# Patient Record
Sex: FEMALE | Race: ASIAN
[De-identification: names, ages, dates, MRNs, and addresses within clinical notes are randomized per-mention and may not be internally consistent; named-entity substitution may affect disease eponyms.]

---

## 2018-08-19 ENCOUNTER — HOSPITAL ENCOUNTER (EMERGENCY)
Dept: HOSPITAL 15 - ER | Age: 58
Discharge: HOME | End: 2018-08-19
Payer: OTHER GOVERNMENT

## 2018-08-19 VITALS — BODY MASS INDEX: 23.02 KG/M2 | WEIGHT: 125.12 LBS | HEIGHT: 62 IN

## 2018-08-19 VITALS — SYSTOLIC BLOOD PRESSURE: 118 MMHG | DIASTOLIC BLOOD PRESSURE: 74 MMHG

## 2018-08-19 DIAGNOSIS — I10: ICD-10-CM

## 2018-08-19 DIAGNOSIS — Z88.8: ICD-10-CM

## 2018-08-19 DIAGNOSIS — E11.9: ICD-10-CM

## 2018-08-19 DIAGNOSIS — E78.5: ICD-10-CM

## 2018-08-19 DIAGNOSIS — Z98.51: ICD-10-CM

## 2018-08-19 DIAGNOSIS — M79.605: Primary | ICD-10-CM

## 2018-08-19 PROCEDURE — 93971 EXTREMITY STUDY: CPT

## 2020-04-06 ENCOUNTER — HOSPITAL ENCOUNTER (OUTPATIENT)
Dept: HOSPITAL 15 - LAB | Age: 60
Discharge: HOME | End: 2020-04-06
Attending: NURSE PRACTITIONER
Payer: COMMERCIAL

## 2020-04-06 DIAGNOSIS — Z03.818: Primary | ICD-10-CM

## 2020-08-19 ENCOUNTER — HOSPITAL ENCOUNTER (EMERGENCY)
Dept: HOSPITAL 15 - ER | Age: 60
Discharge: HOME | End: 2020-08-19
Payer: COMMERCIAL

## 2020-08-19 VITALS — SYSTOLIC BLOOD PRESSURE: 138 MMHG | DIASTOLIC BLOOD PRESSURE: 73 MMHG

## 2020-08-19 VITALS — BODY MASS INDEX: 23.55 KG/M2 | HEIGHT: 62 IN | WEIGHT: 128 LBS
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
## 2020-08-19 DIAGNOSIS — S00.83XA: Primary | ICD-10-CM

## 2020-08-19 DIAGNOSIS — J32.9: ICD-10-CM

## 2020-08-19 PROCEDURE — 70486 CT MAXILLOFACIAL W/O DYE: CPT

## 2020-08-19 PROCEDURE — 72125 CT NECK SPINE W/O DYE: CPT

## 2020-08-19 PROCEDURE — 70450 CT HEAD/BRAIN W/O DYE: CPT

## 2020-11-08 ENCOUNTER — HOSPITAL ENCOUNTER (EMERGENCY)
Dept: HOSPITAL 15 - ER | Age: 60
Discharge: HOME | End: 2020-11-08
Payer: OTHER GOVERNMENT

## 2020-11-08 VITALS — WEIGHT: 129 LBS | BODY MASS INDEX: 23.74 KG/M2 | HEIGHT: 62 IN

## 2020-11-08 VITALS — DIASTOLIC BLOOD PRESSURE: 71 MMHG | SYSTOLIC BLOOD PRESSURE: 151 MMHG

## 2020-11-08 DIAGNOSIS — Z20.828: ICD-10-CM

## 2020-11-08 DIAGNOSIS — H92.01: ICD-10-CM

## 2020-11-08 DIAGNOSIS — J02.9: Primary | ICD-10-CM

## 2020-11-08 DIAGNOSIS — E78.5: ICD-10-CM

## 2020-11-08 DIAGNOSIS — Z98.51: ICD-10-CM

## 2020-11-08 DIAGNOSIS — I10: ICD-10-CM

## 2020-11-08 DIAGNOSIS — E11.9: ICD-10-CM

## 2020-11-08 PROCEDURE — 87426 SARSCOV CORONAVIRUS AG IA: CPT

## 2020-11-08 PROCEDURE — 36415 COLL VENOUS BLD VENIPUNCTURE: CPT

## 2020-11-08 PROCEDURE — 87880 STREP A ASSAY W/OPTIC: CPT

## 2020-11-08 PROCEDURE — 71045 X-RAY EXAM CHEST 1 VIEW: CPT

## 2020-11-08 PROCEDURE — 87070 CULTURE OTHR SPECIMN AEROBIC: CPT

## 2020-11-08 PROCEDURE — 87804 INFLUENZA ASSAY W/OPTIC: CPT

## 2020-12-16 ENCOUNTER — HOSPITAL ENCOUNTER (OUTPATIENT)
Dept: HOSPITAL 15 - LAB | Age: 60
Discharge: HOME | End: 2020-12-16
Attending: NURSE PRACTITIONER
Payer: COMMERCIAL

## 2020-12-16 DIAGNOSIS — U07.1: Primary | ICD-10-CM

## 2020-12-18 ENCOUNTER — HOSPITAL ENCOUNTER (EMERGENCY)
Dept: HOSPITAL 15 - ER | Age: 60
Discharge: HOME | End: 2020-12-18
Payer: COMMERCIAL

## 2020-12-18 VITALS — WEIGHT: 128 LBS | BODY MASS INDEX: 23.55 KG/M2 | HEIGHT: 62 IN

## 2020-12-18 VITALS — SYSTOLIC BLOOD PRESSURE: 188 MMHG | DIASTOLIC BLOOD PRESSURE: 83 MMHG

## 2020-12-18 DIAGNOSIS — J20.8: ICD-10-CM

## 2020-12-18 DIAGNOSIS — U07.1: Primary | ICD-10-CM

## 2020-12-18 DIAGNOSIS — E11.9: ICD-10-CM

## 2020-12-18 DIAGNOSIS — E78.5: ICD-10-CM

## 2020-12-18 DIAGNOSIS — Z98.51: ICD-10-CM

## 2020-12-18 DIAGNOSIS — I10: ICD-10-CM

## 2020-12-18 PROCEDURE — 71045 X-RAY EXAM CHEST 1 VIEW: CPT

## 2020-12-18 PROCEDURE — 99283 EMERGENCY DEPT VISIT LOW MDM: CPT

## 2020-12-18 PROCEDURE — 96372 THER/PROPH/DIAG INJ SC/IM: CPT

## 2020-12-21 ENCOUNTER — HOSPITAL ENCOUNTER (OUTPATIENT)
Dept: HOSPITAL 15 - ER | Age: 60
Discharge: HOME | End: 2020-12-21
Attending: INTERNAL MEDICINE
Payer: OTHER GOVERNMENT

## 2020-12-21 VITALS — SYSTOLIC BLOOD PRESSURE: 137 MMHG | DIASTOLIC BLOOD PRESSURE: 71 MMHG

## 2020-12-21 VITALS — DIASTOLIC BLOOD PRESSURE: 70 MMHG | SYSTOLIC BLOOD PRESSURE: 120 MMHG

## 2020-12-21 VITALS — DIASTOLIC BLOOD PRESSURE: 73 MMHG | SYSTOLIC BLOOD PRESSURE: 133 MMHG

## 2020-12-21 VITALS — DIASTOLIC BLOOD PRESSURE: 77 MMHG | SYSTOLIC BLOOD PRESSURE: 131 MMHG

## 2020-12-21 VITALS — DIASTOLIC BLOOD PRESSURE: 97 MMHG | SYSTOLIC BLOOD PRESSURE: 145 MMHG

## 2020-12-21 VITALS — DIASTOLIC BLOOD PRESSURE: 71 MMHG | SYSTOLIC BLOOD PRESSURE: 127 MMHG

## 2020-12-21 VITALS — BODY MASS INDEX: 29.85 KG/M2 | HEIGHT: 55 IN | WEIGHT: 129 LBS

## 2020-12-21 VITALS — DIASTOLIC BLOOD PRESSURE: 67 MMHG | SYSTOLIC BLOOD PRESSURE: 128 MMHG

## 2020-12-21 DIAGNOSIS — Z23: Primary | ICD-10-CM

## 2020-12-21 DIAGNOSIS — U07.1: ICD-10-CM

## 2021-02-16 ENCOUNTER — HOSPITAL ENCOUNTER (EMERGENCY)
Dept: HOSPITAL 15 - ER | Age: 61
Discharge: HOME | End: 2021-02-16
Payer: OTHER GOVERNMENT

## 2021-02-16 VITALS — SYSTOLIC BLOOD PRESSURE: 157 MMHG | DIASTOLIC BLOOD PRESSURE: 89 MMHG

## 2021-02-16 VITALS — WEIGHT: 125 LBS | HEIGHT: 62 IN | BODY MASS INDEX: 23 KG/M2

## 2021-02-16 DIAGNOSIS — Z20.822: ICD-10-CM

## 2021-02-16 DIAGNOSIS — E86.0: Primary | ICD-10-CM

## 2021-02-16 PROCEDURE — 36415 COLL VENOUS BLD VENIPUNCTURE: CPT

## 2021-02-16 PROCEDURE — 87426 SARSCOV CORONAVIRUS AG IA: CPT

## 2021-02-16 PROCEDURE — 99283 EMERGENCY DEPT VISIT LOW MDM: CPT

## 2023-01-11 ENCOUNTER — HOSPITAL ENCOUNTER (EMERGENCY)
Dept: HOSPITAL 15 - ER | Age: 63
Discharge: HOME | End: 2023-01-11
Payer: OTHER GOVERNMENT

## 2023-01-11 VITALS — WEIGHT: 132.28 LBS | BODY MASS INDEX: 24.34 KG/M2 | HEIGHT: 62 IN

## 2023-01-11 VITALS — DIASTOLIC BLOOD PRESSURE: 83 MMHG | SYSTOLIC BLOOD PRESSURE: 146 MMHG

## 2023-01-11 DIAGNOSIS — E78.5: ICD-10-CM

## 2023-01-11 DIAGNOSIS — I10: ICD-10-CM

## 2023-01-11 DIAGNOSIS — E11.9: ICD-10-CM

## 2023-01-11 DIAGNOSIS — U07.1: Primary | ICD-10-CM

## 2023-01-11 DIAGNOSIS — Z88.8: ICD-10-CM

## 2023-01-11 DIAGNOSIS — Z98.51: ICD-10-CM

## 2023-01-11 PROCEDURE — 87426 SARSCOV CORONAVIRUS AG IA: CPT

## 2023-01-11 PROCEDURE — 36415 COLL VENOUS BLD VENIPUNCTURE: CPT

## 2023-04-08 ENCOUNTER — HOSPITAL ENCOUNTER (EMERGENCY)
Dept: HOSPITAL 15 - ER | Age: 63
Discharge: ANOTHER HOSPITAL | End: 2023-04-08
Payer: OTHER GOVERNMENT

## 2023-04-08 VITALS — BODY MASS INDEX: 24.34 KG/M2 | HEIGHT: 62 IN | WEIGHT: 132.28 LBS

## 2023-04-08 VITALS — SYSTOLIC BLOOD PRESSURE: 122 MMHG | DIASTOLIC BLOOD PRESSURE: 71 MMHG

## 2023-04-08 DIAGNOSIS — N13.2: ICD-10-CM

## 2023-04-08 DIAGNOSIS — E78.5: ICD-10-CM

## 2023-04-08 DIAGNOSIS — I72.2: Primary | ICD-10-CM

## 2023-04-08 DIAGNOSIS — I10: ICD-10-CM

## 2023-04-08 DIAGNOSIS — Z79.2: ICD-10-CM

## 2023-04-08 DIAGNOSIS — Z79.899: ICD-10-CM

## 2023-04-08 DIAGNOSIS — Z20.822: ICD-10-CM

## 2023-04-08 DIAGNOSIS — E11.9: ICD-10-CM

## 2023-04-08 DIAGNOSIS — Z88.8: ICD-10-CM

## 2023-04-08 LAB
ALBUMIN SERPL-MCNC: 3.5 G/DL (ref 3.4–5)
ALP SERPL-CCNC: 54 U/L (ref 45–117)
ALT SERPL-CCNC: 31 U/L (ref 13–56)
ANION GAP SERPL CALCULATED.3IONS-SCNC: 7 MMOL/L (ref 5–15)
BILIRUB SERPL-MCNC: 0.4 MG/DL (ref 0.2–1)
BUN SERPL-MCNC: 21 MG/DL (ref 7–18)
BUN/CREAT SERPL: 23.9 (ref 10–20)
CALCIUM SERPL-MCNC: 8.9 MG/DL (ref 8.5–10.1)
CHLORIDE SERPL-SCNC: 106 MMOL/L (ref 98–107)
CO2 SERPL-SCNC: 24 MMOL/L (ref 21–32)
GLUCOSE SERPL-MCNC: 148 MG/DL (ref 74–106)
HCT VFR BLD AUTO: 39.1 % (ref 36–46)
HGB BLD-MCNC: 13 G/DL (ref 12.2–16.2)
INR PPP: 0.92 (ref 0.9–1.15)
LIPASE SERPL-CCNC: 108 U/L (ref 73–393)
MCH RBC QN AUTO: 31.9 PG (ref 28–32)
MCV RBC AUTO: 96.1 FL (ref 80–100)
NRBC BLD QL AUTO: 0 %
POTASSIUM SERPL-SCNC: 3.4 MMOL/L (ref 3.5–5.1)
PROT SERPL-MCNC: 7.2 G/DL (ref 6.4–8.2)
SODIUM SERPL-SCNC: 137 MMOL/L (ref 136–145)

## 2023-04-08 PROCEDURE — 96374 THER/PROPH/DIAG INJ IV PUSH: CPT

## 2023-04-08 PROCEDURE — 99285 EMERGENCY DEPT VISIT HI MDM: CPT

## 2023-04-08 PROCEDURE — 83690 ASSAY OF LIPASE: CPT

## 2023-04-08 PROCEDURE — 85025 COMPLETE CBC W/AUTO DIFF WBC: CPT

## 2023-04-08 PROCEDURE — 80053 COMPREHEN METABOLIC PANEL: CPT

## 2023-04-08 PROCEDURE — 81001 URINALYSIS AUTO W/SCOPE: CPT

## 2023-04-08 PROCEDURE — 85610 PROTHROMBIN TIME: CPT

## 2023-04-08 PROCEDURE — 96361 HYDRATE IV INFUSION ADD-ON: CPT

## 2023-04-08 PROCEDURE — 96375 TX/PRO/DX INJ NEW DRUG ADDON: CPT

## 2023-04-08 PROCEDURE — 74175 CTA ABDOMEN W/CONTRAST: CPT

## 2023-04-08 PROCEDURE — 74176 CT ABD & PELVIS W/O CONTRAST: CPT

## 2023-04-08 PROCEDURE — 36415 COLL VENOUS BLD VENIPUNCTURE: CPT

## 2023-04-08 PROCEDURE — 87426 SARSCOV CORONAVIRUS AG IA: CPT

## 2023-04-15 ENCOUNTER — HOSPITAL ENCOUNTER (EMERGENCY)
Dept: HOSPITAL 15 - ER | Age: 63
Discharge: HOME | End: 2023-04-15
Payer: MEDICARE

## 2023-04-15 VITALS — BODY MASS INDEX: 25 KG/M2 | HEIGHT: 63 IN | WEIGHT: 141.1 LBS

## 2023-04-15 VITALS — SYSTOLIC BLOOD PRESSURE: 106 MMHG | DIASTOLIC BLOOD PRESSURE: 86 MMHG

## 2023-04-15 DIAGNOSIS — I10: ICD-10-CM

## 2023-04-15 DIAGNOSIS — E78.5: ICD-10-CM

## 2023-04-15 DIAGNOSIS — Z98.890: ICD-10-CM

## 2023-04-15 DIAGNOSIS — E11.9: ICD-10-CM

## 2023-04-15 DIAGNOSIS — R50.9: Primary | ICD-10-CM

## 2023-04-15 DIAGNOSIS — Z98.51: ICD-10-CM

## 2023-04-15 LAB
ALBUMIN SERPL-MCNC: 3.8 G/DL (ref 3.4–5)
ALP SERPL-CCNC: 54 U/L (ref 45–117)
ALT SERPL-CCNC: 23 U/L (ref 13–56)
ANION GAP SERPL CALCULATED.3IONS-SCNC: 7 MMOL/L (ref 5–15)
BILIRUB SERPL-MCNC: 0.9 MG/DL (ref 0.2–1)
BUN SERPL-MCNC: 12 MG/DL (ref 7–18)
BUN/CREAT SERPL: 19.7 (ref 10–20)
CALCIUM SERPL-MCNC: 9 MG/DL (ref 8.5–10.1)
CHLORIDE SERPL-SCNC: 107 MMOL/L (ref 98–107)
CO2 SERPL-SCNC: 23 MMOL/L (ref 21–32)
GLUCOSE SERPL-MCNC: 93 MG/DL (ref 74–106)
HCT VFR BLD AUTO: 34.7 % (ref 36–46)
HGB BLD-MCNC: 11.7 G/DL (ref 12.2–16.2)
MCH RBC QN AUTO: 31.5 PG (ref 28–32)
MCV RBC AUTO: 93.4 FL (ref 80–100)
NRBC BLD QL AUTO: 0.1 %
POTASSIUM SERPL-SCNC: 3.4 MMOL/L (ref 3.5–5.1)
PROT SERPL-MCNC: 7.3 G/DL (ref 6.4–8.2)
SODIUM SERPL-SCNC: 137 MMOL/L (ref 136–145)

## 2023-04-15 PROCEDURE — 85025 COMPLETE CBC W/AUTO DIFF WBC: CPT

## 2023-04-15 PROCEDURE — 81001 URINALYSIS AUTO W/SCOPE: CPT

## 2023-04-15 PROCEDURE — 83605 ASSAY OF LACTIC ACID: CPT

## 2023-04-15 PROCEDURE — 74176 CT ABD & PELVIS W/O CONTRAST: CPT

## 2023-04-15 PROCEDURE — 71046 X-RAY EXAM CHEST 2 VIEWS: CPT

## 2023-04-15 PROCEDURE — 80053 COMPREHEN METABOLIC PANEL: CPT

## 2023-04-15 PROCEDURE — 36415 COLL VENOUS BLD VENIPUNCTURE: CPT

## 2024-07-21 ENCOUNTER — HOSPITAL ENCOUNTER (EMERGENCY)
Dept: HOSPITAL 15 - ER | Age: 64
Discharge: HOME | End: 2024-07-21
Payer: MEDICARE

## 2024-07-21 VITALS
TEMPERATURE: 98.5 F | SYSTOLIC BLOOD PRESSURE: 116 MMHG | HEART RATE: 70 BPM | RESPIRATION RATE: 16 BRPM | OXYGEN SATURATION: 100 % | DIASTOLIC BLOOD PRESSURE: 56 MMHG

## 2024-07-21 VITALS — WEIGHT: 134.48 LBS | HEIGHT: 62 IN | BODY MASS INDEX: 24.75 KG/M2

## 2024-07-21 DIAGNOSIS — R05.9: Primary | ICD-10-CM

## 2024-07-22 ENCOUNTER — HOSPITAL ENCOUNTER (OUTPATIENT)
Dept: HOSPITAL 15 - LAB | Age: 64
Discharge: HOME | End: 2024-07-22
Attending: INTERNAL MEDICINE
Payer: MEDICARE

## 2024-07-22 DIAGNOSIS — I10: ICD-10-CM

## 2024-07-22 DIAGNOSIS — I47.10: Primary | ICD-10-CM

## 2024-07-22 LAB
ALBUMIN SERPL-MCNC: 4.3 G/DL (ref 3.2–4.8)
ALP SERPL-CCNC: 56 U/L (ref 46–116)
ALT SERPL-CCNC: 27 U/L (ref 7–40)
ANION GAP SERPL CALCULATED.3IONS-SCNC: 5 MMOL/L (ref 5–15)
BILIRUB SERPL-MCNC: 0.8 MG/DL (ref 0.2–1)
BUN SERPL-MCNC: 12 MG/DL (ref 9–23)
BUN/CREAT SERPL: 14.6 (ref 10–20)
CALCIUM SERPL-MCNC: 9.7 MG/DL (ref 8.7–10.4)
CHLORIDE SERPL-SCNC: 108 MMOL/L (ref 98–107)
CO2 SERPL-SCNC: 26 MMOL/L (ref 20–30)
GLUCOSE SERPL-MCNC: 105 MG/DL (ref 74–106)
HCT VFR BLD AUTO: 38.1 % (ref 36–46)
HGB BLD-MCNC: 13 G/DL (ref 12.2–16.2)
MCH RBC QN AUTO: 32.5 PG (ref 28–32)
MCV RBC AUTO: 95.2 FL (ref 80–100)
NRBC BLD QL AUTO: 0.2 %
POTASSIUM SERPL-SCNC: 3.9 MMOL/L (ref 3.5–5.1)
PROT SERPL-MCNC: 7 G/DL (ref 5.7–8.2)
SODIUM SERPL-SCNC: 139 MMOL/L (ref 136–145)

## 2024-07-22 PROCEDURE — 36415 COLL VENOUS BLD VENIPUNCTURE: CPT

## 2024-07-22 PROCEDURE — 85025 COMPLETE CBC W/AUTO DIFF WBC: CPT

## 2024-07-22 PROCEDURE — 80053 COMPREHEN METABOLIC PANEL: CPT

## 2024-12-02 ENCOUNTER — HOSPITAL ENCOUNTER (OUTPATIENT)
Dept: HOSPITAL 15 - LAB | Age: 64
Discharge: HOME | End: 2024-12-02
Attending: INTERNAL MEDICINE
Payer: MEDICARE

## 2024-12-02 DIAGNOSIS — E11.9: ICD-10-CM

## 2024-12-02 DIAGNOSIS — E78.5: ICD-10-CM

## 2024-12-02 DIAGNOSIS — I10: Primary | ICD-10-CM

## 2024-12-02 LAB
ALBUMIN SERPL-MCNC: 4.4 G/DL (ref 3.2–4.8)
ALP SERPL-CCNC: 57 U/L (ref 46–116)
ALT SERPL-CCNC: 28 U/L (ref 7–40)
ANION GAP SERPL CALCULATED.3IONS-SCNC: 7 MMOL/L (ref 5–15)
BILIRUB SERPL-MCNC: 0.7 MG/DL (ref 0.2–1)
BUN SERPL-MCNC: 11 MG/DL (ref 9–23)
BUN/CREAT SERPL: 13.3 (ref 10–20)
CALCIUM SERPL-MCNC: 10.3 MG/DL (ref 8.7–10.4)
CHLORIDE SERPL-SCNC: 105 MMOL/L (ref 98–107)
CHOLEST SERPL-MCNC: 200 MG/DL (ref ?–200)
CO2 SERPL-SCNC: 28 MMOL/L (ref 20–31)
GLUCOSE SERPL-MCNC: 99 MG/DL (ref 74–106)
HCT VFR BLD AUTO: 40.6 % (ref 36–46)
HDLC SERPL-MCNC: 88 MG/DL (ref 40–59)
HGB BLD-MCNC: 13.4 G/DL (ref 12.2–16.2)
MCH RBC QN AUTO: 32 PG (ref 28–32)
MCV RBC AUTO: 96.8 FL (ref 80–100)
NRBC BLD QL AUTO: 0.2 %
POTASSIUM SERPL-SCNC: 3.6 MMOL/L (ref 3.5–5.1)
PROT SERPL-MCNC: 7.5 G/DL (ref 5.7–8.2)
SODIUM SERPL-SCNC: 140 MMOL/L (ref 136–145)
TRIGL SERPL-MCNC: 92 MG/DL (ref ?–150)

## 2024-12-02 PROCEDURE — 85025 COMPLETE CBC W/AUTO DIFF WBC: CPT

## 2024-12-02 PROCEDURE — 85652 RBC SED RATE AUTOMATED: CPT

## 2024-12-02 PROCEDURE — 80061 LIPID PANEL: CPT

## 2024-12-02 PROCEDURE — 84439 ASSAY OF FREE THYROXINE: CPT

## 2024-12-02 PROCEDURE — 36415 COLL VENOUS BLD VENIPUNCTURE: CPT

## 2024-12-02 PROCEDURE — 84443 ASSAY THYROID STIM HORMONE: CPT

## 2024-12-02 PROCEDURE — 80053 COMPREHEN METABOLIC PANEL: CPT

## 2024-12-02 PROCEDURE — 83036 HEMOGLOBIN GLYCOSYLATED A1C: CPT

## 2025-02-14 ENCOUNTER — HOSPITAL ENCOUNTER (EMERGENCY)
Dept: HOSPITAL 15 - ER | Age: 65
Discharge: HOME | End: 2025-02-14
Payer: MEDICARE

## 2025-02-14 VITALS
RESPIRATION RATE: 16 BRPM | OXYGEN SATURATION: 99 % | TEMPERATURE: 98.1 F | SYSTOLIC BLOOD PRESSURE: 117 MMHG | HEART RATE: 62 BPM | DIASTOLIC BLOOD PRESSURE: 72 MMHG

## 2025-02-14 VITALS — HEIGHT: 62 IN | WEIGHT: 130.07 LBS | BODY MASS INDEX: 23.94 KG/M2

## 2025-02-14 DIAGNOSIS — E78.5: ICD-10-CM
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
## 2025-02-14 DIAGNOSIS — S86.911A: Primary | ICD-10-CM
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
## 2025-02-14 DIAGNOSIS — Z98.51: ICD-10-CM

## 2025-02-14 DIAGNOSIS — M79.661: ICD-10-CM

## 2025-02-14 DIAGNOSIS — E11.9: ICD-10-CM

## 2025-02-14 DIAGNOSIS — Z88.1: ICD-10-CM
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
## 2025-02-14 DIAGNOSIS — I10: ICD-10-CM

## 2025-02-14 DIAGNOSIS — Z88.8: ICD-10-CM
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
## 2025-02-14 PROCEDURE — 93971 EXTREMITY STUDY: CPT

## 2025-02-14 NOTE — ED.PDOC
Musculoskeletal


HPI Comments


A 65 YEAR OLD FEMALE PRESENTS TO THE ED WITH COMPLAINT OF RIGHT LOWER LEG PAIN. 

PATIENT STATES SHE HAD A 16 HOUR FLIGHT 1 WEEK AGO AND BEGAN TO EXPERIENCE RIGHT

LOWER LEG PAIN 3 DAYS AGO.  PATIENT REPORTS SHE WOULD LIKE TO HAVE AN ULTRASOUND

DONE TO RULE OUT A DVT IN HER RIGHT LOWER LEG.  PATIENT DENIES FEVER, CHILLS, 

SHORTNESS OF BREATH, CHEST PAIN, ABDOMINAL PAIN, NAUSEA, VOMITING, HEADACHE, OR 

OTHER COMPLAINTS. NO OTHER SYMPTOMS OR MODIFYING FACTORS AT THIS TIME. PATIENT 

IS ALERT, ORIENTED X 4, AND HAS STEADY GAIT.


Chief Complaint:  Lower Extremity


Time Seen by MD:  09:10


Primary Care Provider:  ELVI


Reviewed Notes:  Nurses Notes, Medications, Allergies


Allergies:  


Coded Allergies:  


     Butalbital (Verified  Allergy, Unknown, 8/19/18)


     Caffeine (Verified  Allergy, Unknown, 8/19/18)


Home Meds


Active Scripts


Benzonatate (Benzonatate) 200 Mg Cap, 1 CAP PO TID, #30 CAP


   Prov:SEVERINO LEWIS         12/28/23


Ciprofloxacin Hcl (Cipro) 500 Mg Tab, 1 TAB PO BID, #20 TAB


   Prov:SEVERINO LEWIS         12/28/23


Ciprofloxacin Hcl (Cipro) 500 Mg Tab, 500 MG PO BID for 7 Days, #14 TAB


   Prov:PADMINI CHA MD         3/2/23


Meclizine Hcl (Meclizine Hcl) 12.5 Mg Tab, 1 TAB PO BID, #14 TAB


   Prov:PADMINI CHA MD         3/2/23


Albuterol Sulfate (Albuterol Sulfate Hfa) 108 Mcg/Act Aer, 108 MCG IN QID PRN 

for 10 Days, #1 INHALER


   Prov:OJ GARNER DO         1/7/23


Benzonatate (Tessalon Perles) 100 Mg Cap, 200 MG PO TID for 10 Days, #30 CAP 1 

Refill


   Prov:OJ GARNER DO         1/7/23


Azithromycin (Zithromax Z-Sergo) 250 Mg Tab, 250 MG PO DAILY for 5 Days, #6 TAB 1 

Refill


   Prov:OJ GARNER DO         1/7/23


Phenazopyridine HCl (Phenazopyridine Hydrochlo) 200 Mg Tab, 200 MG PO BID, #6 

TAB


   Prov:SEVERINO LEWIS         10/26/22


Sulfamethoxazole W/Trimethopri (Bactrim Ds Tablet) 1 Tab Tb, 1 TAB PO BID, #20 

TAB


   Prov:SEVERINO LEWIS         10/26/22


Information Source:  Patient


Mode of Arrival:  Ambulatory


Location:  Right


Extremity Location:  Leg


Timing:  Days


Prehospital treatment:  None


Severity:  Moderate


Able to Move Extremity:  Yes


Bear Weight:  Fully


Pain:  Moderate


Mechanism:  No Trauma, Spontaneous


Circumstances:  Spontaneous


Onset of Symptoms:  Spontaneous


Symptoms:  Pain


DVT Risk Factors:  NONE


Last Tetanus:  Unknown


Associated signs and symptoms:  Leg pain





Past Medical History


PAST MEDICAL HISTORY:  DM, High Lipids, HTN


Surgical History:  Tubal Ligation


GYN History:  No Pertinent GYN History





Family History


Family History:  Reviewed,noncontributory to illness, No family hx of HTN, No 

family hx of Stroke





Social History


Smoker:  Non-Smoker


Alcohol:  Occasionally


Drugs:  Denies Drug Use


Lives In:  Home





Constitutional:  denies: chills, diaphoresis, fatigue, fever, malaise, sweats, 

weakness, others


EENTM:  denies: blurred vision, double vision, ear bleeding, ear discharge, ear 

drainage, ear pain, ear ringing, eye pain, eye redness, hearing loss, mouth 

pain, mouth swelling, nasal discharge, nose bleeding, nose congestion, nose 

pain, photophobia, tearing, throat pain, throat swelling, voice changes, others


Respiratory:  denies: cough, hemoptysis, orthopnea, SOB at rest, shortness of 

breath, SOB with excertion, stridor, wheezing, others


Cardiovascular:  denies: chest pain, dizzy spells, diaphoresis, Dyspnea on 

exertion, edema, irregular heart beat, left arm pain, lightheadedness, 

palpitations, PND, syncope, others


Gastrointestinal:  denies: abdomen distended, abdominal pain, blood streaked 

bowels, constipated, diarrhea, dysphagia, difficulty swallowing, hematemesis, 

melena, nausea, poor appetite, poor fluid intake, rectal bleeding, rectal pain, 

vomiting, others


Genitourinary:  denies: abnormal vagina bleeding, burning, dyspareunia, dysuria,

flank pain, frequency, hematuria, incontinence, pain, pregnant, vagina 

discharge, urgency, others


Neurological:  denies: dizziness, fainting, headache, left sided numbness, left 

sided weakness, numbness, paresthesia, pre-existing deficit, right sided 

numbness, right sided weakness, seizure, speech problems, tingling, tremors, 

weakness, others


Musculoskeletal:  reports: muscle pain (RIGHT LOWER LEG ), others (RIGHT LOWER 

LEG PAIN); denies: back pain, gout, joint pain, joint swelling, muscle 

stiffness, neck pain


Integumetry:  denies: bruises, change in color, change in hair/nails, dryness, 

laceration, lesions, lumps, rash, wounds, others


Allergic/Immunocompromised:  denies: Difficulty Healing, Frequent Infections, 

Hives, Itching, others


Hematologic/Lymphatic:  denies: anemia, blood clots, easy bleeding, easy 

bruising, swollen glands, others


Endocrine:  denies: excessive hunger, excessive sweating, excessive thirst, 

excessive urination, flushing, intolerance to cold, intolerance to heat, 

unexplained weight gain, unexplained weight loss, others


Psychiatric:  denies: anxiety, bipolar disorder, depression, hopeless, panic 

disorder, schizophrenia, sleepless, suicidal, others


All Other Systems:  Reviewed and Negative





Physical Exam


General Appearance:  No Apparent Distress, Normal


HEENT:  Normal ENT Inspection, PERRL/EOMI, Pharynx Normal, TMs Normal


Neck:  Full Range of Motion, Non-Tender, Normal, Normal Inspection


Respiratory:  Chest Non-Tender, Lungs Clear, No Accessory Muscle Use, No 

Respiratory Distress, Normal Breath Sounds


Cardiovascular:  No Edema, No JVD, No Murmur, No Gallop, Normal Peripheral 

Pulses, Regular Rate/Rhythm


Breast Exam:  Deferred


Gastrointestinal:  No Organomegaly, Non Tender, No Pulsatile Mass, Normal Bowel 

Sounds, Soft


Genitalia:  Deferred


Pelvic:  Deferred


Rectal:  Deferred


Extremities:  No calf tenderness, Normal capillary refill, Normal range of 

motion, No pedal edema, Tender (WITH MILD SWELLING ON RIGHT LOWER LEG, NO 

REDNESS AND SKIN RASH, NO DVT SIGNS. )


Musculoskeletal :  


   Apperance:  Normal


Neurologic:  Alert, CNs II-XII nml as Tested, No Motor Deficits, Normal Affect, 

Normal Mood, No Sensory Deficits


Cerebellar Function:  Normal


Reflexes:  Normal


Skin:  Dry, Normal Color, Warm


Peripheral Pulses:  2+ carotid (R), 2+ carotid (L), 2+ dorsalis pedis (R), 2+ 

dorsalis pedis (L)


Lymphatic:  No Adenopathy





Was a procedure done?


Was a procedure done?:  No





Differential Diagnosis EXT


Differential Diagnosis:  Cellulitis, Deep Vein Thrombosis, Sprain, Strain


Other Differential Diagnosis


SUPERFICIAL THROMBOSIS





X-Ray, Labs, Meds, VS





                                   Vital Signs








  Date Time  Temp Pulse Resp B/P (MAP) Pulse Ox O2 Delivery O2 Flow Rate FiO2


 


2/14/25 09:51  62 16  99 Room Air  


 


2/14/25 09:51 98.1 62 16 117/72 (87) 99   





 98.1       


 


2/14/25 09:15 98.1 62 16 117/72 (87) 99   





 RIGHT LOWER EXTREMITY VENOUS DOPPLER 





CLINICAL HISTORY: PAIN RT THIGH CALF





TECHNIQUE: 





Right lower extremity venous doppler study was performed.





COMPARISON: None





FINDINGS: The right common femoral, superficial femoral, popliteal, posterior 

tibial veins and trifurcation appear patent with normal augmentation, phasicity,

compressibility and color-flow. 





IMPRESSION: 





1. No sonographic evidence of DVT in the right leg.





HS:Y  





Electronically Signed by: Vijay Rogers at 02/14/2025 09:47:30 AM











DICTATED BY: VIJAY ROGERS MD


DICTATED DATE/TIME: 02/14/25 0947





SIGNED BY: VIJAY ROGERS MD


SIGNED DATE/TIME: 02/14/25 0947





CC: 


X-Ray, Labs, Meds, VS Comment


EXTERNAL MEDICAL RECORDS REVIEWED: [NONE] 


INDEPENDENT HISTORIANS: [NONE]


SOCIAL DETERMINANTS OF HEALTH: [NONE]





LABS ORDERED: NONE


REVIEWED AND INTERPRETED RESULTS: NONE





IMAGING ORDERED: 


CV VENOUS DOPPLER LOW EXT RT





TREATMENTS ORDERED: 





PROCEDURES PERFORMED: NONE





CRITICAL CARE TIME: NONE





I HAVE DISCUSSED THE PATIENT WITH THE ATTENDING PHYSICIAN DR. DENNY AND HE 

AGREES WITH THE PATIENT'S PLAN OF CARE AND DISPOSITION.





BASED ON HISTORY OF PRESENT ILLNESS, AND PHYSICAL EXAM, PATIENT WILL BE 

DISCHARGED HOME. 





SHARED DECISION MAKING: PATIENT INSTRUCTED TO FOLLOW UP WITH PRIMARY CARE 

PROVIDER IN 1-2 DAYS FOR RE-EVALUATION OF SYMPTOMS. PATIENT VERBALIZES 

UNDERSTANDING TO RETURN TO ED FOR NEW OR WORSENING SYMPTOMS OR IF FOLLOW UP WITH

 PCP CANNOT BE OBTAINED. PATIENT FEELS COMFORTABLE GOING HOME AT THIS TIME. ALL 

QUESTIONS ADDRESSED AT TIME OF DISCHARGE.


Images Reviewed?:  Images reviewed and evaluated by me


Time of 1ST Reevaluation:  10:06


Reevaluation 1ST:  Improved


Patient Education/Counseling:  Diagnosis, Treatment, Need For Follow Up


Family Education/Counseling:  Diagnosis, Treatment, Need For Follow Up


Medical Screening:  No EMC Exist At This Time





Departure 1


Departure


Time of Disposition:  10:06


Impression:  


   Primary Impression:  


   Muscle strain of right lower leg


   Qualified Codes:  S86.911A - Strain of unspecified muscle(s) and tendon(s) at

    lower leg level, right leg, initial encounter


Disposition:  01 HOME / SELF CARE / HOMELESS


Condition:  Stable





Additional Instructions:  


FOLLOW-UP WITH PCP IN 1 TO 2 DAYS.  RETURN TO ED FOR ANY NEW OR WORSENING 

SYMPTOMS.


Discharged With:  Self





Critical Care Note


Critical Care Time?:  No





Stability


Stability form required:  No








I personally scribed for SEVERINO LEWIS ([email or web address]) on 2/14/25 at 09:54.  Elec

tronically submitted by Ricardo Nuñez ([email or web address]).


I personally scribed for SEVERINO LEWIS ([email or web address]) on 2/14/25 at 09:55.  Elect

ronically submitted by Ricardo Nuñez ([email or web address]).





SEVERINO LEWIS                 Feb 14, 2025 09:54

## 2025-02-14 NOTE — [facility]
RIGHT LOWER EXTREMITY VENOUS DOPPLER 



CLINICAL HISTORY: PAIN RT THIGH CALF



TECHNIQUE: 



Right lower extremity venous doppler study was performed.



COMPARISON: None



FINDINGS: The right common femoral, superficial femoral, popliteal, posterior tibial veins and trifur
cation appear patent with normal augmentation, phasicity, compressibility and color-flow. 



IMPRESSION: 



1. No sonographic evidence of DVT in the right leg.



HS:Y  



Electronically Signed by: Vijay Rogers at 02/14/2025 09:47:30 AM